# Patient Record
Sex: FEMALE | Race: WHITE | NOT HISPANIC OR LATINO | Employment: FULL TIME | URBAN - METROPOLITAN AREA
[De-identification: names, ages, dates, MRNs, and addresses within clinical notes are randomized per-mention and may not be internally consistent; named-entity substitution may affect disease eponyms.]

---

## 2018-03-27 ENCOUNTER — HOSPITAL ENCOUNTER (EMERGENCY)
Facility: HOSPITAL | Age: 33
Discharge: HOME | End: 2018-03-27
Attending: EMERGENCY MEDICINE | Admitting: EMERGENCY MEDICINE

## 2018-03-27 VITALS
WEIGHT: 254 LBS | HEART RATE: 101 BPM | DIASTOLIC BLOOD PRESSURE: 82 MMHG | OXYGEN SATURATION: 99 % | HEIGHT: 60 IN | RESPIRATION RATE: 18 BRPM | BODY MASS INDEX: 49.87 KG/M2 | SYSTOLIC BLOOD PRESSURE: 162 MMHG | TEMPERATURE: 98.8 F

## 2018-03-27 DIAGNOSIS — R20.2 PARESTHESIAS: Primary | ICD-10-CM

## 2018-03-27 LAB
ANION GAP SERPL CALCULATED.3IONS-SCNC: 13.6 MMOL/L
BUN BLD-MCNC: 9 MG/DL (ref 6–20)
BUN/CREAT SERPL: 15.3 (ref 7–25)
CALCIUM SPEC-SCNC: 9.5 MG/DL (ref 8.6–10.5)
CHLORIDE SERPL-SCNC: 102 MMOL/L (ref 98–107)
CO2 SERPL-SCNC: 23.4 MMOL/L (ref 22–29)
CREAT BLD-MCNC: 0.59 MG/DL (ref 0.57–1)
D DIMER PPP FEU-MCNC: <0.27 MCGFEU/ML (ref 0–0.49)
GFR SERPL CREATININE-BSD FRML MDRD: 117 ML/MIN/1.73
GLUCOSE BLD-MCNC: 106 MG/DL (ref 65–99)
POTASSIUM BLD-SCNC: 3.7 MMOL/L (ref 3.5–5.2)
SODIUM BLD-SCNC: 139 MMOL/L (ref 136–145)

## 2018-03-27 PROCEDURE — 80048 BASIC METABOLIC PNL TOTAL CA: CPT | Performed by: EMERGENCY MEDICINE

## 2018-03-27 PROCEDURE — 36415 COLL VENOUS BLD VENIPUNCTURE: CPT

## 2018-03-27 PROCEDURE — 85379 FIBRIN DEGRADATION QUANT: CPT | Performed by: EMERGENCY MEDICINE

## 2018-03-27 PROCEDURE — 99283 EMERGENCY DEPT VISIT LOW MDM: CPT

## 2018-03-27 RX ORDER — NIFEDIPINE 60 MG/1
60 TABLET, FILM COATED, EXTENDED RELEASE ORAL DAILY
COMMUNITY
Start: 2018-03-23

## 2018-03-28 NOTE — ED PROVIDER NOTES
" [facility] EMERGENCY DEPARTMENT ENCOUNTER    CHIEF COMPLAINT  Chief Complaint: foot discomfort  History given by: patient  History limited by: nothing  U Room Number: 48/48  PMD: VALENTIN Vo      HPI:  Pt is a 32 y.o. female who presents complaining of an intermittent vibrating and tingling sensation in her left foot that she first noticed yesterday. Pt states that the vibrating sensation lasts for several minutes and then resolves. Pt denies having any foot pain or recent injury to her left foot but states that she did wear a pair of boots yesterday that she has not worn \"in a long time\". Pt does complain of mild left calf pain but denies taking any contraceptive pills. Pt states that she is usually sedentary while at work.    Onset: gradual  Duration: 2 days  Severity: moderate  Associated symptoms: calf pain  Previous treatment: none    PAST MEDICAL HISTORY  Active Ambulatory Problems     Diagnosis Date Noted   • No Active Ambulatory Problems     Resolved Ambulatory Problems     Diagnosis Date Noted   • No Resolved Ambulatory Problems     Past Medical History:   Diagnosis Date   • Hypertension        PAST SURGICAL HISTORY  History reviewed. No pertinent surgical history.    FAMILY HISTORY  History reviewed. No pertinent family history.    SOCIAL HISTORY  Social History     Social History   • Marital status:      Spouse name: N/A   • Number of children: N/A   • Years of education: N/A     Occupational History   • Not on file.     Social History Main Topics   • Smoking status: Former Smoker   • Smokeless tobacco: Not on file   • Alcohol use Yes      Comment: occationally   • Drug use: No   • Sexual activity: Defer     Other Topics Concern   • Not on file     Social History Narrative   • No narrative on file       ALLERGIES  Review of patient's allergies indicates no known allergies.    REVIEW OF SYSTEMS  Review of Systems   Constitutional: Negative for chills and fever.   HENT: Negative for sore " "throat.    Respiratory: Negative for cough.    Gastrointestinal: Negative for nausea and vomiting.   Musculoskeletal: Positive for myalgias (left calf). Negative for arthralgias and back pain.        \"vibrating\" sensation in left foot   Neurological:        Left foot tingling       PHYSICAL EXAM  ED Triage Vitals [03/27/18 2141]   Temp Heart Rate Resp BP SpO2   98.8 °F (37.1 °C) 101 18 -- 99 %      Temp src Heart Rate Source Patient Position BP Location FiO2 (%)   Tympanic Monitor -- -- --       Physical Exam   Constitutional: No distress.   HENT:   Head: Normocephalic and atraumatic.   Cardiovascular:   Pulses:       Dorsalis pedis pulses are 2+ on the left side.        Posterior tibial pulses are 2+ on the left side.   Pulmonary/Chest: No respiratory distress.   Abdominal:   Morbidly obese   Musculoskeletal: Normal range of motion. She exhibits no edema.        Right ankle: No tenderness.        Left lower leg: She exhibits no tenderness.        Left foot: There is normal range of motion and no tenderness.   Neurological: She is alert. She has normal sensation and normal strength.   Skin: Skin is warm and dry. No rash noted.       LAB RESULTS  Lab Results (last 24 hours)     Procedure Component Value Units Date/Time    Basic Metabolic Panel [[id number]]  (Abnormal) Collected:  03/27/18 2201    Specimen:  Blood Updated:  03/27/18 2231     Glucose 106 (H) mg/dL      BUN 9 mg/dL      Creatinine 0.59 mg/dL      Sodium 139 mmol/L      Potassium 3.7 mmol/L      Chloride 102 mmol/L      CO2 23.4 mmol/L      Calcium 9.5 mg/dL      eGFR Non African Amer 117 mL/min/1.73      BUN/Creatinine Ratio 15.3     Anion Gap 13.6 mmol/L     Narrative:       GFR Normal >60  Chronic Kidney Disease <60  Kidney Failure <15    D-dimer, Quantitative [[id number]]  (Normal) Collected:  03/27/18 2201    Specimen:  Blood Updated:  03/27/18 2232     D-Dimer, Quantitative <0.27 MCGFEU/mL     Narrative:       The Stago D-Dimer test used in " conjunction with a clinical pretest probability (PTP) assessment model, has been approved by the FDA to rule out the presence of venous thromboembolism (VTE) in outpatients suspected of deep venous thrombosis (DVT) or pulmonary embolism (PE).           I ordered the above labs and reviewed the results    PROCEDURES  Procedures      PROGRESS AND CONSULTS  ED Course   Comment By Time   10:36 PM  Patient with sensation of vibration in left foot.  No symptoms now. Has normal pulses and has negative dimer.  May be fasciculations in foot.  Doubt CVA with these symptoms.  Will discharge home.  Return for any concerns. Damien Alvarado MD 03/27 2235 2151- D/w pt that the best way to r/o a DVT is to order a doppler but that doppler is not available at this time of the night. Discussed the plan to order D-dimer and check her potassium level for further evaluations. Pt understands and agrees with the plan, all questions answered.    2153- Ordered D-Dimer and BMP for further evaluation.    MEDICAL DECISION MAKING  Results were reviewed/discussed with the patient and they were also made aware of online access. Pt also made aware that some labs, such as cultures, will not be resulted during ER visit and follow up with PMD is necessary.     MDM  Number of Diagnoses or Management Options     Amount and/or Complexity of Data Reviewed  Clinical lab tests: ordered and reviewed  Decide to obtain previous medical records or to obtain history from someone other than the patient: yes  Review and summarize past medical records: yes (Pt last saw her PCP on 3/23/18 for routine follow up.)           DIAGNOSIS  Final diagnoses:   Paresthesias       DISPOSITION  DISCHARGE    Patient discharged in stable condition.    Reviewed implications of results, diagnosis, meds, responsibility to follow up, warning signs and symptoms of possible worsening, potential complications and reasons to return to ER, including fever, worsening pain or any  concerns.    Patient/Family voiced understanding of above instructions.    Discussed plan for discharge, as there is no emergent indication for admission. Patient referred to primary care provider for BP management due to today's BP. Pt/family is agreeable and understands need for follow up and repeat testing.  Pt is aware that discharge does not mean that nothing is wrong but it indicates no emergency is present that requires admission and they must continue care with follow-up as given below or physician of their choice.     FOLLOW-UP  Preethi Greer, APRN  [id number] [facility] [id number]  [phone]    Schedule an appointment as soon as possible for a visit in 2 days           Medication List      No changes were made to your prescriptions during this visit.           Latest Documented Vital Signs:  As of 10:38 PM  BP- 162/82 HR- 101 Temp- 98.8 °F (37.1 °C) (Tympanic) O2 sat- 99%    --  Documentation assistance provided by jose l Soto for Dr. Alvarado.  Information recorded by the scrrene was done at my direction and has been verified and validated by me.     Suri Soto  03/27/18 2207       Damien Alvarado MD  03/27/18 7113

## 2018-03-28 NOTE — ED TRIAGE NOTES
"PT is alert and oriented with no apparent distress. PT ambulated to triage window with steady gait. PT reports \"pulsating or vibrating feeling in my left foot\" since yesterday. Denies injury to the foot. PT also reports that foot feels tingly sometimes.   "